# Patient Record
Sex: FEMALE | Race: WHITE
[De-identification: names, ages, dates, MRNs, and addresses within clinical notes are randomized per-mention and may not be internally consistent; named-entity substitution may affect disease eponyms.]

---

## 2022-10-07 ENCOUNTER — HOSPITAL ENCOUNTER (OUTPATIENT)
Dept: HOSPITAL 75 - RAD | Age: 72
End: 2022-10-07
Attending: NURSE PRACTITIONER
Payer: COMMERCIAL

## 2022-10-07 DIAGNOSIS — Z12.31: Primary | ICD-10-CM

## 2022-10-07 PROCEDURE — 77063 BREAST TOMOSYNTHESIS BI: CPT

## 2022-10-07 PROCEDURE — 77067 SCR MAMMO BI INCL CAD: CPT

## 2022-10-10 NOTE — DIAGNOSTIC IMAGING REPORT
INDICATION: 

Routine screening.



COMPARISON:     

06/11/2021.



TECHNIQUE: 

2D and 3D bilateral screening mammography was performed with CAD.



FINDINGS:

Scattered fibroglandular densities are identified bilaterally.

The parenchymal pattern appears stable. No mass or

malignant-appearing microcalcifications are seen. Occasional

benign calcifications are noted. The axillae are unremarkable.



IMPRESSION: 

No mammographic features suspicious for malignancy are

identified.



ACR BI-RADS Category 2: Benign findings.

Result letter will be mailed to the patient.

Note: At least 10% of breast cancer is not imaged by mammography.



Dictated by: 



  Dictated on workstation # [id number]